# Patient Record
Sex: MALE | Race: OTHER | NOT HISPANIC OR LATINO
[De-identification: names, ages, dates, MRNs, and addresses within clinical notes are randomized per-mention and may not be internally consistent; named-entity substitution may affect disease eponyms.]

---

## 2023-12-26 ENCOUNTER — APPOINTMENT (OUTPATIENT)
Dept: OTOLARYNGOLOGY | Facility: CLINIC | Age: 38
End: 2023-12-26
Payer: COMMERCIAL

## 2023-12-26 VITALS
TEMPERATURE: 98.2 F | HEIGHT: 68 IN | SYSTOLIC BLOOD PRESSURE: 126 MMHG | BODY MASS INDEX: 31.07 KG/M2 | OXYGEN SATURATION: 95 % | WEIGHT: 205 LBS | DIASTOLIC BLOOD PRESSURE: 82 MMHG | HEART RATE: 93 BPM

## 2023-12-26 DIAGNOSIS — Z78.9 OTHER SPECIFIED HEALTH STATUS: ICD-10-CM

## 2023-12-26 DIAGNOSIS — H61.22 IMPACTED CERUMEN, LEFT EAR: ICD-10-CM

## 2023-12-26 DIAGNOSIS — J38.3 OTHER DISEASES OF VOCAL CORDS: ICD-10-CM

## 2023-12-26 DIAGNOSIS — R49.0 DYSPHONIA: ICD-10-CM

## 2023-12-26 DIAGNOSIS — J38.01 PARALYSIS OF VOCAL CORDS AND LARYNX, UNILATERAL: ICD-10-CM

## 2023-12-26 PROBLEM — Z00.00 ENCOUNTER FOR PREVENTIVE HEALTH EXAMINATION: Status: ACTIVE | Noted: 2023-12-26

## 2023-12-26 PROCEDURE — 99205 OFFICE O/P NEW HI 60 MIN: CPT | Mod: 25

## 2023-12-26 PROCEDURE — 70371 SPEECH EVALUATION COMPLEX: CPT | Mod: 59

## 2023-12-26 PROCEDURE — 99204 OFFICE O/P NEW MOD 45 MIN: CPT | Mod: 25

## 2023-12-26 PROCEDURE — 69210 REMOVE IMPACTED EAR WAX UNI: CPT | Mod: LT

## 2023-12-26 PROCEDURE — 31579 LARYNGOSCOPY TELESCOPIC: CPT

## 2023-12-27 NOTE — HISTORY OF PRESENT ILLNESS
[de-identified] : [birth year]23 37yo male who presents with concern for dysphonia.  Symptoms began 3 months ago.  Voice is now raspy in nature.  It is consistent raspy, but having taking some anti reflux medications there has been improvement.  No issues chewing, eating or swallowing.  No breathing issues.  He does not some pain on the left neck.  Seen in [place] and dx with reflux and given Nexium which has helped slightly.  No singing.  Works as a , but not other voice demands.  Diet: + 1 cup caffeine daily, mint, blueberries, tomatoe, garlic, onion, citrus, carbonated beverage.  - chocolate

## 2023-12-27 NOTE — ASSESSMENT
[FreeTextEntry1] : - 37yo male who presents with concern for dysphonia.  Symptoms began 3 months ago. 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 I am recommending CT neck and follow up in 3 weeks after for review. Based on findings consider vocal cord injection and augmentation procedure. On exam there is evidence of left cerumen impaction. This was cleaned today without issue. Patient noted immediate improvement back to baseline.   -CT neck -Followup in 3 weeks after above

## 2023-12-27 NOTE — PROCEDURE
[FreeTextEntry3] :  Ear cleaning: Cerumen Removal/Ear Cleaning for Otitis Externa Pre-operative Diagnosis: left Cerumen Impaction Post-operative Diagnosis: Same Procedure: Binocular microscopy with cerumen removal- [id number] Procedure Details: The patient was placed in the supine position. The operating microscope was positioned. I then placed the ear speculum in the EAC. Cerumen was then removed using a mixture of otologic curettes, and suction. The TM was noted to be intact. I then performed the procedure of the opposite ear in similar fashion. The patient tolerated procedure well. Findings: Bilateral Ear Canal - normal Bilateral Tympanic Membrane - normal Recommendations: Debrox Complications: None [de-identified] : -  Pre-operative Diagnosis: Dysphonia Post-operative Diagnosis: Left vocal cord paresis, borderline paralysis Anesthesia: Topical - 1 % Lidocaine/Phenylephrine Procedure: Flexible Laryngoscopy with Stroboscopy - [facility] [address]  Procedure Details: The patient was placed in the sitting position. After decongestant and anesthesia were applied the laryngoscope was passed. The nasal cavities, nasopharynx, oropharynx, hypopharynx, and larynx were all examined. Vocal folds were examined during respiration and phonation. The following findings were noted:  Findings: Nose: Septum is midline, turbinates are normal, nasal airways patent, mucosa normal Nasopharynx: Adenoids normal, no masses, eustachian tube normal Oropharynx: Pharyngeal walls symmetric and without lesion. Tonsils/fossae symmetric Hypopharynx: Hypopharynx and pyriform sinuses without lesion. No masses or asymmetry. No pooling of secretions. Larynx: Epiglottis and aryepiglottic folds were sharp and crisp bilaterally. Bilateral false vocal folds normal appearance. Airway was widely patent. Left vocal cord paresis   Strobe Exam Ratings   TVF Appearance: Normal TVF Mobility: Right vocal fold mobile, left vocal fold paretic if not paralyzed Edema/hypertrophy: normal Mucus on TVF: minimal Glottic Closure: + glottic gap Mucosal Wave: Reduced Amplitude of Vibration: Reduced Phase: Asymmetric Supraglottic Hyperfunction: none Other Findings: N/A  Condition: Stable. Patient tolerated procedure well.  Complications: None -----------------------------------------------------  Procedure: Pharyngeal and speech evaluation, by cine or video - CPT 25263	 Voice assessment was recorded via video recording on this date.  Clarity: Reduced, hoarse and raspy Range: Reduced Pitch Control: Reduced Projection: Decreased Tremor: None Cough: Weak  Condition: Stable.  Patient tolerated procedure well. Complications: None

## 2023-12-27 NOTE — PHYSICAL EXAM
[Normal] : mucosa is normal [Midline] : trachea located in midline position [FreeTextEntry1] : Hoarse and raspy voice with decreased pitch control decreased range decreased projection.  Weak cough [de-identified] : left cerumen impaction- cleaned away with suction/curette with no issues.

## 2024-01-11 ENCOUNTER — OUTPATIENT (OUTPATIENT)
Dept: OUTPATIENT SERVICES | Facility: HOSPITAL | Age: 39
LOS: 1 days | End: 2024-01-11
Payer: COMMERCIAL

## 2024-01-11 ENCOUNTER — APPOINTMENT (OUTPATIENT)
Dept: CT IMAGING | Facility: HOSPITAL | Age: 39
End: 2024-01-11

## 2024-01-11 LAB
POCT ISTAT CREATININE: 0.9 MG/DL — SIGNIFICANT CHANGE UP (ref 0.5–1.3)
POCT ISTAT CREATININE: 0.9 MG/DL — SIGNIFICANT CHANGE UP (ref 0.5–1.3)

## 2024-01-11 PROCEDURE — 82565 ASSAY OF CREATININE: CPT

## 2024-01-11 PROCEDURE — 70491 CT SOFT TISSUE NECK W/DYE: CPT | Mod: 26

## 2024-01-11 PROCEDURE — 70491 CT SOFT TISSUE NECK W/DYE: CPT

## 2024-01-18 ENCOUNTER — APPOINTMENT (OUTPATIENT)
Dept: OTOLARYNGOLOGY | Facility: CLINIC | Age: 39
End: 2024-01-18
Payer: COMMERCIAL

## 2024-01-18 VITALS
OXYGEN SATURATION: 97 % | WEIGHT: 205 LBS | HEIGHT: 68 IN | TEMPERATURE: 97.8 F | DIASTOLIC BLOOD PRESSURE: 80 MMHG | HEART RATE: 74 BPM | BODY MASS INDEX: 31.07 KG/M2 | SYSTOLIC BLOOD PRESSURE: 120 MMHG

## 2024-01-18 DIAGNOSIS — R09.81 NASAL CONGESTION: ICD-10-CM

## 2024-01-18 PROCEDURE — 31574Z: CUSTOM | Mod: 50

## 2024-01-18 RX ORDER — FLUTICASONE PROPIONATE 50 UG/1
50 SPRAY, METERED NASAL DAILY
Qty: 3 | Refills: 1 | Status: ACTIVE | COMMUNITY
Start: 2024-01-18 | End: 1900-01-01

## 2024-01-19 NOTE — PROCEDURE
[FreeTextEntry3] :  - PREOPERATIVE DIAGNOSIS: dysphonia, presbylarynges  POSTOPERATIVE DIAGNOSIS: Same as above  NAME OF PROCEDURE: Bilateral vocal fold injection medialization - [id number]  SURGEON: Kalen Cunningham MD  ASSISTANTS: VINCENT Coleman  ANESTHESIA: Topical Lidocaine  ESTIMATED BLOOD LOSS: < 1 cc  SPECIMENS: None  COMPLICATIONS: None  INDICATIONS FOR SURGERY: This is a patient with dysphonia found to have a glottic insufficieny and presbylarynges on examination. The decision was made to proceed with a vocal fold injection medialization. The patient understands the risks, benefits, and alternatives of the surgery including possibility of worsened voice, pain, discomfort, bleeding, need for further surgery, difficulty swallowing, and wished to go ahead with the operation as planned. I spoke with the patient and all of their questions were answered to their satisfaction and they asked me to perform this procedure.  DETAILS OF THE PROCEDURE: The patient was brought to the procedure room and I identified them. A formal timeout was called.  Using topical anesthesia including a mixture of 2% lidocaine with afrin, I anesthetized the nasal cavity, oral cavity, oropharynx and hypopharynx topically. Using the flexible nasopharyngolaryngoscope I was able to have an enlarged view of the endolarynx on a monitor. Under indirect microscopic visualization further anesthetized the endolarynx, epiglottis, arytenoids and vocal folds with 2 cc of topical 4% lidocaine.  Once properly anesthetized, visualization of the larynx was achieved transnasally. I then introduced the vocal fold injector transorally. Under indirect visualization the injector was placed in the LEFT paraglottic space of the affecting vocal fold. 0.5 cc of hyaluronic acid was injected until the vocal fold was medialized. The injector was then withdrawn.  I then introduced the vocal fold injector again transorally. Under indirect visualization the injector was placed in the RIGHT paraglottic space of the affecting vocal fold. 0.5 cc of hyaluronic acid was injected until the vocal fold was medialized. The injector was then withdrawn.  On nasopharyngolaryngoscopy there was evidence of glottic closure. The patient tolerated the procedure. We allowed for several minutes of observation and voice use. The patient noted significant improvement in their voice.   Juvederm Ultra - 1cc used GTIN: [id number] Lot: [id number] Exp: 10/26/2024

## 2024-02-05 NOTE — PROCEDURE
[de-identified] : -  Pre-operative Diagnosis: Dysphonia Post-operative Diagnosis: Left vocal cord paresis, borderline paralysis Anesthesia: Topical - 1 % Lidocaine/Phenylephrine Procedure: Flexible Laryngoscopy with Stroboscopy - [facility] [address]  Procedure Details: The patient was placed in the sitting position. After decongestant and anesthesia were applied the laryngoscope was passed. The nasal cavities, nasopharynx, oropharynx, hypopharynx, and larynx were all examined. Vocal folds were examined during respiration and phonation. The following findings were noted:  Findings: Nose: Septum is midline, turbinates are normal, nasal airways patent, mucosa normal Nasopharynx: Adenoids normal, no masses, eustachian tube normal Oropharynx: Pharyngeal walls symmetric and without lesion. Tonsils/fossae symmetric Hypopharynx: Hypopharynx and pyriform sinuses without lesion. No masses or asymmetry. No pooling of secretions. Larynx: Epiglottis and aryepiglottic folds were sharp and crisp bilaterally. Bilateral false vocal folds normal appearance. Airway was widely patent. Left vocal cord paresis   Strobe Exam Ratings   TVF Appearance: Normal TVF Mobility: Right vocal fold mobile, left vocal fold paretic if not paralyzed Edema/hypertrophy: normal Mucus on TVF: minimal Glottic Closure: + glottic gap Mucosal Wave: Reduced Amplitude of Vibration: Reduced Phase: Asymmetric Supraglottic Hyperfunction: none Other Findings: N/A  Condition: Stable. Patient tolerated procedure well.  Complications: None -----------------------------------------------------  Procedure: Pharyngeal and speech evaluation, by cine or video - CPT 49886	 Voice assessment was recorded via video recording on this date.  Clarity: Reduced, hoarse and raspy Range: Reduced Pitch Control: Reduced Projection: Decreased Tremor: None Cough: Weak  Condition: Stable.  Patient tolerated procedure well. Complications: None

## 2024-02-05 NOTE — PHYSICAL EXAM
[FreeTextEntry1] : Hoarse and raspy voice with decreased pitch control decreased range decreased projection.  Weak cough

## 2024-02-16 ENCOUNTER — APPOINTMENT (OUTPATIENT)
Dept: OTOLARYNGOLOGY | Facility: CLINIC | Age: 39
End: 2024-02-16